# Patient Record
Sex: FEMALE | Race: ASIAN | NOT HISPANIC OR LATINO | URBAN - METROPOLITAN AREA
[De-identification: names, ages, dates, MRNs, and addresses within clinical notes are randomized per-mention and may not be internally consistent; named-entity substitution may affect disease eponyms.]

---

## 2021-03-30 ENCOUNTER — EMERGENCY (EMERGENCY)
Facility: HOSPITAL | Age: 51
LOS: 1 days | Discharge: HOME | End: 2021-03-30
Admitting: EMERGENCY MEDICINE
Payer: MEDICAID

## 2021-03-30 VITALS
TEMPERATURE: 98 F | HEART RATE: 75 BPM | SYSTOLIC BLOOD PRESSURE: 154 MMHG | OXYGEN SATURATION: 100 % | DIASTOLIC BLOOD PRESSURE: 75 MMHG | RESPIRATION RATE: 18 BRPM

## 2021-03-30 PROCEDURE — 99284 EMERGENCY DEPT VISIT MOD MDM: CPT

## 2021-03-30 NOTE — ED ADULT TRIAGE NOTE - CHIEF COMPLAINT QUOTE
Pt. c/o right ankle pain and swelling after a mechanical fall. States unable to bear weight on right foot. Denies hitting head, loss of consciousness.

## 2021-03-31 PROCEDURE — 73610 X-RAY EXAM OF ANKLE: CPT | Mod: 26,RT

## 2021-03-31 RX ORDER — IBUPROFEN 200 MG
600 TABLET ORAL ONCE
Refills: 0 | Status: COMPLETED | OUTPATIENT
Start: 2021-03-31 | End: 2021-03-31

## 2021-03-31 RX ORDER — ACETAMINOPHEN 500 MG
650 TABLET ORAL ONCE
Refills: 0 | Status: COMPLETED | OUTPATIENT
Start: 2021-03-31 | End: 2021-03-31

## 2021-03-31 RX ADMIN — Medication 600 MILLIGRAM(S): at 00:49

## 2021-03-31 RX ADMIN — Medication 650 MILLIGRAM(S): at 00:48

## 2021-03-31 NOTE — ED PROVIDER NOTE - NSFOLLOWUPINSTRUCTIONS_ED_ALL_ED_FT
No weight bearing to your right leg  -Keep splint clean dry and intact until your follow up appt  -Rest ice elevate affected limb  -Follow up at [facility] within 1 wk of discharge, call [phone] for appt      Ankle Fracture    The ankle joint is made up of the lower (distal) sections of your lower leg bones(tibia and fibula) along with a bone in your foot (talus). An ankle fracture is a break in one, two, or all three of these sections of bone. There are two general types of ankle fractures:  Stable fracture. This happens when one of your bones is broken, but the bones of your ankle joint stay in their normal positions.  Unstable fracture. This type can include more than one broken bone. It can also happen if your outer bone is broken and the tough bands of tissue that connect bones (ligaments)are also injured at your inner ankle. This type of fracture allows the talus to move out of its normal position.  What are the causes?  This condition may be caused by:  A hard, direct hit (blow) to the ankle.  Quickly and severely twisting your ankle, often while your foot is planted and the rest of your body moving.  Trauma, such as a car accident or falling from a height.  What increases the risk?  This condition is more likely to occur in people who:  Smoke.  Are overweight.  Participate in sports that involve quick direction changes, as in soccer.  Do high-impact sports like gymnastics or football.  Are involved in a high-impact car accident.  What are the signs or symptoms?  ImageSymptoms of this condition include:  Tender and swollen ankle.  Bruising around the injured ankle.  Pain when moving or pressing on the ankle.  Trouble walking or using the ankle to support your body weight (putting weight on the ankle).  Pain that gets worse when moving or standing and gets better with rest.  How is this diagnosed?  An ankle fracture is usually diagnosed with a physical exam and X-rays. A CT scan or MRI may also be done.    How is this treated?  Treatment for this condition depends on the type of ankle fracture you have. Stable fractures are treated with a cast, boot, or splint to hold the ankle still and crutches to avoid putting weight on the injured ankle until the fracture heals. Unstable fractures require surgery to ensure that the bones heal properly. After surgery, you will have a splint. After your incision is healed, your surgeon may give you a cast or a boot. You will not be able to put weight on your injured side for several weeks.    After your ankle has healed, you will do exercises to improve the strength and mobility of your ankle.    Follow these instructions at home:  If you have a splint:     Wear the splint as told by your health care provider. Remove it only as told by your health care provider.  Loosen the splint if your toes tingle, become numb, or turn cold and blue.  Keep the splint clean.  If the splint is not waterproof:  Do not let it get wet.  Cover it with a watertight covering when you take a bath or a shower.  If you have a cast:     Do not stick anything inside the cast to scratch your skin. Doing that increases your risk of infection.  Check the skin around the cast every day. Tell your health care provider about any concerns.  You may put lotion on dry skin around the edges of the cast. Do not put lotion on the skin underneath the cast.  Keep the cast clean.  If the cast is not waterproof:  Do not let it get wet.  Cover it with a watertight covering when you take a bath or a shower.  Managing pain, stiffness, and swelling     If directed, put ice on the injured area:  If you have a removable splint, remove it as told by your health care provider.  Put ice in a plastic bag.  Place a towel between your skin and the bag or between your cast and the bag.  Leave the ice on for 20 minutes, 2–3 times a day.  Move your toes often to avoid stiffness and to lessen swelling.  Raise (elevate) the injured area above the level of your heart while you are sitting or lying down.  General instructions     Do not use the injured limb to support your body weight until your health care provider says that you can. Use crutches as told by your health care provider  Take over-the-counter and prescription medicines only as told by your health care provider.  Ask your health care provider when it is safe to drive if you have a cast or splint.  Do exercises as told by your health care provider.  Do not use any products that contain nicotine or tobacco, such as cigarettes and e-cigarettes. These can delay bone healing. If you need help quitting, ask your health care provider  Keep all follow-up visits as told by your health care provider. This is important.  Contact a health care provider if:  You have pain or swelling that gets worse or does not get better with rest or medicine.  Get help right away if:  Your cast gets damaged.  You have severe pain that lasts.  You develop new pain or swelling.  Your skin or toenails below the injury turn blue or gray, feel cold, become numb, or have a loss of sensitivity to touch.  Summary  An ankle fracture can either be stable or unstable. This is determined after a physical exam and imaging studies like X-rays, a CT scan, or MRI.  Stable fractures are treated with a cast, boot, or splint to hold the ankle still until the fracture heals. Unstable fractures require surgery to ensure that the bones heal properly.  You will not be able to put weight on your injured side for several weeks.  Pain medicines, icing, and raising (elevating) your injured ankle when sitting or lying down may help with pain relief. Follow instructions as told by your health care provider.  This information is not intended to replace advice given to you by your health care provider. Make sure you discuss any questions you have with your health care provider.

## 2021-03-31 NOTE — CONSULT NOTE ADULT - SUBJECTIVE AND OBJECTIVE BOX
Podiatry pager #: [phone] ([place])/ [phone] ([facility])    Patient is a 50y old  Female who presents with a chief complaint of right foot pain.    HPI: Pt. c/o right ankle pain and swelling after a mechanical fall. States unable to bear weight on right foot. Denies hitting head, loss of consciousness.  ankle pain/injury        PAST MEDICAL & SURGICAL HISTORY:      MEDICATIONS  (STANDING):    MEDICATIONS  (PRN):      Allergies    penicillin (Angioedema)    Intolerances        VITALS:    Vital Signs Last 24 Hrs  T(C): 36.8 (30 Mar 2021 21:58), Max: 36.8 (30 Mar 2021 21:58)  T(F): 98.2 (30 Mar 2021 21:58), Max: 98.2 (30 Mar 2021 21:58)  HR: 75 (30 Mar 2021 21:58) (75 - 75)  BP: 154/75 (30 Mar 2021 21:58) (154/75 - 154/75)  BP(mean): --  RR: 18 (30 Mar 2021 21:58) (18 - 18)  SpO2: 100% (30 Mar 2021 21:58) (100% - 100%)    LABS:                CAPILLARY BLOOD GLUCOSE              LOWER EXTREMITY PHYSICAL EXAM:    Vascular: DP/PT 2_/4 B/L, CFT <_3 seconds B/L, Temperature gradient warm to cool_ B/L  Neuro: Epicritic sensation _ intact to the level of _ankle B/L  Musculoskeletal/Ortho: R ankle POP ATFL, pain on inversion, pain dorsum navicular   Skin: no open wounds, no acute SOI      RADIOLOGY & ADDITIONAL STUDIES:    < from: Xray Ankle Complete 3 Views, Right (03.31.21 @ 00:52) >  ******PRELIMINARY REPORT******    ******PRELIMINARY REPORT******            EXAM:  RAD ANKLE MIN 3 VIEWS RIGHT        PROCEDURE DATE:  Mar 31 2021     ******PRELIMINARY REPORT******    ******PRELIMINARY REPORT******            INTERPRETATION:  On the lateral view, there is cortical irregularity of the navicular bone with adjacent ossific densities representing avulsion fracture. Associated soft tissue swelling.          ******PRELIMINARY REPORT******    ******PRELIMINARY REPORT******          TRES ELDRIDGE MD; Resident Radiology    < end of copied text >

## 2021-03-31 NOTE — ED PROVIDER NOTE - PHYSICAL EXAMINATION
R ankle; TTP and swelling surrounding lateral malleolus. +distal pulses, motor, sensory. Skin intact

## 2021-03-31 NOTE — ED PROVIDER NOTE - OBJECTIVE STATEMENT
51 y/o F pmh DM c/o R ankle x this evening. Pt reports she missed a step while walking and turned her ankle. 51 y/o F pmh DM c/o R ankle pain and swelling x this evening. Pt reports she missed a step while walking and turned her ankle. Did not take anything for pain. Pt having difficulty bearing weight 2/2 pain. Has no other complaints

## 2021-03-31 NOTE — ED PROVIDER NOTE - PATIENT PORTAL LINK FT
You can access the FollowMyHealth Patient Portal offered by [facility] by registering at the following website: [email or web address]. By joining [facility] FollowMyHealth portal, you will also be able to view your health information using other applications (apps) compatible with our system.

## 2021-03-31 NOTE — CONSULT NOTE ADULT - ASSESSMENT
50 F w/ R foot and ankle pain   - pt seen and evaluated  - R ankle POP ATFL, pain on inversion, pain dorsum navicular   -  On the lateral view of xray, there is cortical irregularity of the navicular bone with adjacent ossific densities representing avulsion fracture. Associated soft tissue swelling.  - RLE placed in willis compression w/ posterior splint  - NWB to right lower extremity  - keep splint clean dry and intact until f/u appt  - rest ice elevate affected limb  - pt to f/u at [facility] within 1 wk of d/c, call [phone] for appt  - discussed w/ attending

## 2021-03-31 NOTE — ED ADULT NURSE NOTE - OBJECTIVE STATEMENT
51yo female received in intake 10b. pt A&OX3, hx of hysterectomy, c/o right ankle pain and swelling after falling off the stairs today. swelling note, no abrasion or open wound noted. denies loc, hitting her head, or anticoagulant use. respiration even and non-labored. in nad. medicated as per order. awaiting for xray result.